# Patient Record
Sex: MALE | Race: WHITE | NOT HISPANIC OR LATINO | Employment: FULL TIME | URBAN - METROPOLITAN AREA
[De-identification: names, ages, dates, MRNs, and addresses within clinical notes are randomized per-mention and may not be internally consistent; named-entity substitution may affect disease eponyms.]

---

## 2020-01-17 ENCOUNTER — OFFICE VISIT (OUTPATIENT)
Dept: FAMILY MEDICINE | Facility: CLINIC | Age: 30
End: 2020-01-17
Payer: COMMERCIAL

## 2020-01-17 VITALS
OXYGEN SATURATION: 97 % | RESPIRATION RATE: 20 BRPM | HEART RATE: 81 BPM | WEIGHT: 212 LBS | BODY MASS INDEX: 30.35 KG/M2 | SYSTOLIC BLOOD PRESSURE: 120 MMHG | HEIGHT: 70 IN | DIASTOLIC BLOOD PRESSURE: 78 MMHG | TEMPERATURE: 98.5 F

## 2020-01-17 DIAGNOSIS — M25.561 RIGHT KNEE PAIN, UNSPECIFIED CHRONICITY: Primary | ICD-10-CM

## 2020-01-17 PROCEDURE — 99203 OFFICE O/P NEW LOW 30 MIN: CPT | Performed by: PHYSICIAN ASSISTANT

## 2020-01-17 RX ORDER — NABUMETONE 500 MG/1
500 TABLET, FILM COATED ORAL 2 TIMES DAILY PRN
Qty: 60 TABLET | Refills: 0 | Status: SHIPPED | OUTPATIENT
Start: 2020-01-17 | End: 2022-11-02

## 2020-01-17 ASSESSMENT — MIFFLIN-ST. JEOR: SCORE: 1927.88

## 2020-01-17 NOTE — PROGRESS NOTES
"Subjective     Nikolay Myers is a 29 year old male who presents to clinic today for the following health issues:    History of Present Illness        He eats 2-3 servings of fruits and vegetables daily.He consumes 0 sweetened beverage(s) daily.He exercises with enough effort to increase his heart rate 10 to 19 minutes per day.  He exercises with enough effort to increase his heart rate 3 or less days per week.   He is taking medications regularly.     Joint Pain    Onset: 5 months    Description:   Location: right knee  Character: Dull ache    Intensity: mild    Progression of Symptoms: same    Accompanying Signs & Symptoms:  Other symptoms: none    History:   Previous similar pain: no       Precipitating factors:   Trauma or overuse: YES    Alleviating factors:  Improved by: nothing    Therapies Tried and outcome: ibuprofen              Reviewed and updated as needed this visit by Provider         Review of Systems   ROS COMP: Constitutional, HEENT, cardiovascular, pulmonary, gi and gu systems are negative, except as otherwise noted.      Objective    /78 (BP Location: Right arm, Patient Position: Chair, Cuff Size: Adult Large)   Pulse 81   Temp 98.5  F (36.9  C) (Oral)   Resp 20   Ht 1.778 m (5' 10\")   Wt 96.2 kg (212 lb)   SpO2 97%   BMI 30.42 kg/m    Body mass index is 30.42 kg/m .  Physical Exam   GENERAL APPEARANCE: healthy, alert and no distress  RESP: lungs clear to auscultation - no rales, rhonchi or wheezes  CV: regular rates and rhythm, normal S1 S2, no S3 or S4 and no murmur, click or rub  ORTHO: Knee Exam: Inspection: small effusion  Tender: patella tendon  Non-tender: lateral joint line, medial joint line  Active Range of Motion: full flexion, full extension  Special tests: normal Valgus stress test, normal Varus, negative Lachman's test               Assessment & Plan     1. Right knee pain, unspecified chronicity  Discussed options. Pt would like imaging given length of pain and feeling " "like knee is buckling.  Await imaging.   - MR Knee Right w/o Contrast; Future  - nabumetone (RELAFEN) 500 MG tablet; Take 1 tablet (500 mg) by mouth 2 times daily as needed for moderate pain  Dispense: 60 tablet; Refill: 0     Tobacco Cessation:   reports that he has never smoked. His smokeless tobacco use includes chew.  Tobacco Cessation Action Plan: Information offered: Patient not interested at this time      BMI:   Estimated body mass index is 30.42 kg/m  as calculated from the following:    Height as of this encounter: 1.778 m (5' 10\").    Weight as of this encounter: 96.2 kg (212 lb).   Weight management plan: Discussed healthy diet and exercise guidelines        Work on weight loss  Regular exercise    No follow-ups on file.    Nydia Ruiz PA-C  [facility]    "

## 2020-01-21 ENCOUNTER — HOSPITAL ENCOUNTER (OUTPATIENT)
Dept: MRI IMAGING | Facility: CLINIC | Age: 30
Discharge: HOME | End: 2020-01-21
Attending: PHYSICIAN ASSISTANT | Admitting: PHYSICIAN ASSISTANT
Payer: COMMERCIAL

## 2020-01-21 DIAGNOSIS — M25.561 RIGHT KNEE PAIN, UNSPECIFIED CHRONICITY: ICD-10-CM

## 2020-01-21 PROCEDURE — 73721 MRI JNT OF LWR EXTRE W/O DYE: CPT | Mod: RT

## 2020-01-22 ENCOUNTER — TELEPHONE (OUTPATIENT)
Dept: FAMILY MEDICINE | Facility: CLINIC | Age: 30
End: 2020-01-22

## 2020-01-22 DIAGNOSIS — S83.281D TEAR OF LATERAL CARTILAGE OR MENISCUS OF KNEE, CURRENT, RIGHT, SUBSEQUENT ENCOUNTER: ICD-10-CM

## 2020-01-22 DIAGNOSIS — S83.282D TEAR OF LATERAL CARTILAGE OR MENISCUS OF KNEE, CURRENT, LEFT, SUBSEQUENT ENCOUNTER: Primary | ICD-10-CM

## 2020-01-22 NOTE — TELEPHONE ENCOUNTER
Pt returned call,  Message given  Voiced understanding  Given phone number to schedule appt    Kortney Rangel RN, BS  Clinical Nurse Triage.

## 2020-01-22 NOTE — TELEPHONE ENCOUNTER
Please call ptCitlaly Link,    Your MRI showed a little tearing in the front of the lateral meniscus due to general wear and tear.  There was also a cartilage defect in the back of the knee as well.  As we discussed, often symptoms are improved by physical therapy, but I think I would like you to have an orthopedics consult first.     I have placed a referral. A representative will call you within 1 business day to help schedule your appointment, or you may contact the [facility] Representative at:    All areas ~ [phone]        1. Mild abnormal signal in the midportion of the anterior horn lateral  meniscus, series 9 image 10, likely representing intrameniscal  degeneration. Difficult to exclude some mild degenerative tearing in  this region.  2. 0.8 cm cartilage defect posterior aspect lateral femoral condyle as  seen on coronal series 8 image 23.  3. Small joint effusion.

## 2020-01-27 NOTE — TELEPHONE ENCOUNTER
RECORDS RECEIVED FROM: Tear of lateral cartilage or meniscus of knee, current, right/MRI/Nydia Ruiz PA-C/bcbs/ortho con   DATE RECEIVED: Feb 3, 2020   NOTES STATUS DETAILS   OFFICE NOTE from referring provider Internal  Nydia Ruiz PA-C    OFFICE NOTE from other specialist N/A    DISCHARGE SUMMARY from hospital N/A    DISCHARGE REPORT from the ER N/A    OPERATIVE REPORT N/A    MEDICATION LIST Internal    IMPLANT RECORD/STICKER N/A    LABS     CBC/DIFF N/A    CULTURES N/A    INJECTIONS DONE IN RADIOLOGY N/A    MRI Internal    CT SCAN N/A    XRAYS (IMAGES & REPORTS) Internal    TUMOR     PATHOLOGY  Slides & report N/A      01/27/20   2:59 PM   Pre-visit complete  Nikki Glamm, CMA

## 2020-02-03 ENCOUNTER — OFFICE VISIT (OUTPATIENT)
Dept: ORTHOPEDICS | Facility: CLINIC | Age: 30
End: 2020-02-03
Payer: COMMERCIAL

## 2020-02-03 ENCOUNTER — PRE VISIT (OUTPATIENT)
Dept: ORTHOPEDICS | Facility: CLINIC | Age: 30
End: 2020-02-03

## 2020-02-03 VITALS — WEIGHT: 205 LBS | HEIGHT: 70 IN | BODY MASS INDEX: 29.35 KG/M2

## 2020-02-03 DIAGNOSIS — S83.281D TEAR OF LATERAL CARTILAGE OR MENISCUS OF KNEE, CURRENT, RIGHT, SUBSEQUENT ENCOUNTER: ICD-10-CM

## 2020-02-03 ASSESSMENT — MIFFLIN-ST. JEOR: SCORE: 1896.12

## 2020-02-03 NOTE — PROGRESS NOTES
CHIEF CONCERN: Right lateral knee pain    HISTORY:   Very pleasant 29-year-old male injured his right knee last summer while playing in a [employer] kickball tournament he fell kind of awkwardly onto his right knee hyper valgus bent knee.  Pain over the lateral aspect of his knee.  He admits that his knee is sore however he is able to do everything that he wants to do.  It does not limit him too much.  He is able to do the things that he is wants to do.  I asked him today if you is bothered enough that he would consider surgery he admits that he would if you could fix it for the future but he is really not that bothered at this time from it.    PAST MEDICAL HISTORY: (Reviewed with the patient and in the [facility] medical record)  1. None    PAST SURGICAL HISTORY: (Reviewed with the patient and in the [facility] medical record)  1. None    MEDICATIONS: (Reviewed with the patient and in the [facility] medical record)    Notable medications include: None    ALLERGIES: (Reviewed with the patient and in the [facility] medical record)  1. None      SOCIAL HISTORY: (Reviewed with the patient and in the medical record)  --Tobacco: Non-smoker  --Occupation: He works as a   --Avocation/Sport: He enjoys golf bowling spending time with friends    FAMILY HISTORY: (Reviewed with the patient and in the medical record)  -- No family history of bleeding, clotting, or difficulty with anesthesia    REVIEW OF SYSTEMS: (Reviewed with the patient and on the health intake form)  -- A comprehensive 10 point review of systems was conducted and is negative except as noted in the HPI    EXAM:     General: Awake, Alert and Oriented, No acute Distress. Articulate and Interactive    Body mass index is 29.41 kg/m .    Right lower extremity :    Skin is Warm and Well perfused, no suggestion of infection    Stable to varus and valgus stress testing.  Stable anterior and posterior drawer testing.  No pivot shift.    Range of motion 0 to 135 degrees    Lachman 0, no  pivot shift    No crepitation    Trace lateral joint line tenderness    EHL/FHL/TA/GS 5/5    Sensation intact L3-S1    2+ Dorsalis Pedis Pulse    IMAGING:    Plain Radiographs: No fractures dislocations.  Well-preserved joint space    MRI: MRI does demonstrate an isolated focal chondral defect of the lateral femoral condyle less than 10 mm in diameter.  Some change is noted in the anterior horn lateral meniscus that I do not think this represents a discrete tear    ASSESSMENT:  1. Focal chondral defect lateral femoral condyle    PLAN:  1. I long discussion with the patient regarding his right knee.  I reviewed with him his diagnosis and I showed him the images.  2. I did tell him that in general I do not believe in prophylactic cartilage surgery.  The best reason to do surgery is if he is symptomatic and he is unable to do the things that he wants to do  3. He can follow-up with me on an as-needed basis if he would like to discuss surgery in the future  4. Realistic surgical plan will be knee arthroscopy, chondroplasty, possible osteochondral autograft transplantation

## 2020-02-03 NOTE — LETTER
2/3/2020       RE: Nikolay Myers  [address] [facility] [address]     Dear Colleague,    Thank you for referring your patient, Nikolay Myers, to the [facility] at [facility]. Please see a copy of my visit note below.    CHIEF CONCERN: Right lateral knee pain    HISTORY:   Very pleasant 29-year-old male injured his right knee last summer while playing in a [employer] kickball tournament he fell kind of awkwardly onto his right knee hyper valgus bent knee.  Pain over the lateral aspect of his knee.  He admits that his knee is sore however he is able to do everything that he wants to do.  It does not limit him too much.  He is able to do the things that he is wants to do.  I asked him today if you is bothered enough that he would consider surgery he admits that he would if you could fix it for the future but he is really not that bothered at this time from it.    PAST MEDICAL HISTORY: (Reviewed with the patient and in the [facility] medical record)  1. None    PAST SURGICAL HISTORY: (Reviewed with the patient and in the [facility] medical record)  1. None    MEDICATIONS: (Reviewed with the patient and in the [facility] medical record)    Notable medications include: None    ALLERGIES: (Reviewed with the patient and in the [facility] medical record)  1. None      SOCIAL HISTORY: (Reviewed with the patient and in the medical record)  --Tobacco: Non-smoker  --Occupation: He works as a   --Avocation/Sport: He enjoys golf bowling spending time with friends    FAMILY HISTORY: (Reviewed with the patient and in the medical record)  -- No family history of bleeding, clotting, or difficulty with anesthesia    REVIEW OF SYSTEMS: (Reviewed with the patient and on the health intake form)  -- A comprehensive 10 point review of systems was conducted and is negative except as noted in the HPI    EXAM:     General: Awake, Alert and Oriented, No acute Distress. Articulate and Interactive    Body  mass index is 29.41 kg/m .    Right lower extremity :    Skin is Warm and Well perfused, no suggestion of infection    Stable to varus and valgus stress testing.  Stable anterior and posterior drawer testing.  No pivot shift.    Range of motion 0 to 135 degrees    Lachman 0, no pivot shift    No crepitation    Trace lateral joint line tenderness    EHL/FHL/TA/GS 5/5    Sensation intact L3-S1    2+ Dorsalis Pedis Pulse    IMAGING:    Plain Radiographs: No fractures dislocations.  Well-preserved joint space    MRI: MRI does demonstrate an isolated focal chondral defect of the lateral femoral condyle less than 10 mm in diameter.  Some change is noted in the anterior horn lateral meniscus that I do not think this represents a discrete tear    ASSESSMENT:  1. Focal chondral defect lateral femoral condyle    PLAN:  1. I long discussion with the patient regarding his right knee.  I reviewed with him his diagnosis and I showed him the images.  2. I did tell him that in general I do not believe in prophylactic cartilage surgery.  The best reason to do surgery is if he is symptomatic and he is unable to do the things that he wants to do  3. He can follow-up with me on an as-needed basis if he would like to discuss surgery in the future  4. Realistic surgical plan will be knee arthroscopy, chondroplasty, possible osteochondral autograft transplantation        Again, thank you for allowing me to participate in the care of your patient.      Sincerely,    Maykel Santiago MD

## 2020-03-01 ENCOUNTER — HEALTH MAINTENANCE LETTER (OUTPATIENT)
Age: 30
End: 2020-03-01

## 2020-10-05 ENCOUNTER — VIRTUAL VISIT (OUTPATIENT)
Dept: FAMILY MEDICINE | Facility: CLINIC | Age: 30
End: 2020-10-05
Payer: COMMERCIAL

## 2020-10-05 DIAGNOSIS — B00.9 HERPES SIMPLEX VIRUS INFECTION: Primary | ICD-10-CM

## 2020-10-05 PROCEDURE — 99213 OFFICE O/P EST LOW 20 MIN: CPT | Mod: 95 | Performed by: FAMILY MEDICINE

## 2020-10-05 RX ORDER — ACYCLOVIR 400 MG/1
400 TABLET ORAL EVERY 8 HOURS
Qty: 15 TABLET | Refills: 3 | Status: SHIPPED | OUTPATIENT
Start: 2020-10-05 | End: 2022-11-02

## 2020-10-05 NOTE — PROGRESS NOTES
"Nikolay Myers is a 30 year old male who is being evaluated via a billable video visit.      The patient has been notified of following:     \"This video visit will be conducted via a call between you and your physician/provider. We have found that certain health care needs can be provided without the need for an in-person physical exam.  This service lets us provide the care you need with a video conversation.  If a prescription is necessary we can send it directly to your pharmacy.  If lab work is needed we can place an order for that and you can then stop by our lab to have the test done at a later time.    Video visits are billed at different rates depending on your insurance coverage.  Please reach out to your insurance provider with any questions.    If during the course of the call the physician/provider feels a video visit is not appropriate, you will not be charged for this service.\"    Patient has given verbal consent for Video visit? Yes  How would you like to obtain your AVS? MyChart  If you are dropped from the video visit, the video invite should be resent to: Text to cell phone: [phone]  Will anyone else be joining your video visit? No    Subjective     Nikolay Myers is a 30 year old male who presents today via video visit for the following health issues:    HPI     Rash  Onset/Duration: 1 week  Description  Location: upper lip  Character: red  Itching: no  Intensity:  mild  Progression of Symptoms:  improving  Accompanying signs and symptoms:   Fever: no  Body aches or joint pain: no  Sore throat symptoms: no  Recent cold symptoms: no  History:           Previous episodes of similar rash: YES  New exposures:  None  Recent travel: no  Exposure to similar rash: no  Precipitating or alleviating factors: none  Therapies tried and outcome: none    Cold sore on his upper lip, he's had years of on and off issues,      Video Start Time: 9:30        Review of Systems   Constitutional, HEENT, " "cardiovascular, pulmonary, gi and gu systems are negative, except as otherwise noted.      Objective           Vitals:  No vitals were obtained today due to virtual visit.    Physical Exam     GENERAL: Healthy, alert and no distress  EYES: Eyes grossly normal to inspection.  No discharge or erythema, or obvious scleral/conjunctival abnormalities.  RESP: No audible wheeze, cough, or visible cyanosis.  No visible retractions or increased work of breathing.    SKIN: Visible skin clear. No significant rash, abnormal pigmentation or lesions.  NEURO: Cranial nerves grossly intact.  Mentation and speech appropriate for age.  PSYCH: Mentation appears normal, affect normal/bright, judgement and insight intact, normal speech and appearance well-groomed.      Completed 9:36          Assessment & Plan     Herpes simplex virus infection    - acyclovir (ZOVIRAX) 400 MG tablet; Take 1 tablet (400 mg) by mouth every 8 hours for 5 days     BMI:   Estimated body mass index is 29.41 kg/m  as calculated from the following:    Height as of 2/3/20: 1.778 m (5' 10\").    Weight as of 2/3/20: 93 kg (205 lb).                No follow-ups on file.    Maykel Sims MD  [facility]      Video-Visit Details    Type of service:  Video Visit    Video End Time:9:36    Originating Location (pt. Location): Home    Distant Location (provider location):  [facility] [place]     Platform used for Video Visit: Dejan        "

## 2020-12-14 ENCOUNTER — HEALTH MAINTENANCE LETTER (OUTPATIENT)
Age: 30
End: 2020-12-14

## 2021-04-17 ENCOUNTER — HEALTH MAINTENANCE LETTER (OUTPATIENT)
Age: 31
End: 2021-04-17

## 2021-10-02 ENCOUNTER — HEALTH MAINTENANCE LETTER (OUTPATIENT)
Age: 31
End: 2021-10-02

## 2022-05-08 ENCOUNTER — HEALTH MAINTENANCE LETTER (OUTPATIENT)
Age: 32
End: 2022-05-08

## 2022-11-02 ENCOUNTER — VIRTUAL VISIT (OUTPATIENT)
Dept: FAMILY MEDICINE | Facility: CLINIC | Age: 32
End: 2022-11-02
Payer: COMMERCIAL

## 2022-11-02 DIAGNOSIS — K21.9 GASTROESOPHAGEAL REFLUX DISEASE, UNSPECIFIED WHETHER ESOPHAGITIS PRESENT: ICD-10-CM

## 2022-11-02 DIAGNOSIS — R06.83 SNORING: Primary | ICD-10-CM

## 2022-11-02 PROCEDURE — 99213 OFFICE O/P EST LOW 20 MIN: CPT | Mod: 95 | Performed by: PHYSICIAN ASSISTANT

## 2022-11-02 RX ORDER — PANTOPRAZOLE SODIUM 40 MG/1
40 TABLET, DELAYED RELEASE ORAL DAILY
Qty: 30 TABLET | Refills: 1 | Status: SHIPPED | OUTPATIENT
Start: 2022-11-02 | End: 2023-01-06

## 2022-11-02 NOTE — PATIENT INSTRUCTIONS
Nikolay,    I sent in protonix for your heartburn. Take this 30 minutes before breakfast.   Try to cut down/out caffeine, alcohol, red sauces and large meals.  Get a wedge for your bed to have your head a little elevated.  Follow up with me if your symptoms do not improve.

## 2022-11-02 NOTE — PROGRESS NOTES
Nikolay is a 32 year old who is being evaluated via a billable video visit.      How would you like to obtain your AVS? MyChart  If the video visit is dropped, the invitation should be resent by: Text to cell phone: [phone]  Will anyone else be joining your video visit? No              Subjective   Nikolay is a 32 year old presenting for the following health issues:  Referral (Sleep apnea testing) and Gastrophageal Reflux      HPI     Pt interested in a sleep study  Pt snores per his wife x about a year  Pt admits he has some sleepiness by 5pm  He wakes 2-3 times per night but able to fall back to sleep  He was told he stops breathing     Pt has gerd sxs that come and go  Worse if he drinks red wine  Sometimes has to get up and vomit acid when he has burning in esophagus  Takes zantac and tums prn which sometimes helps, and sometimes doesn't  He has tried prilosec which didn't seem to help x 14 days  Coffee: 2-3 cups per day  Etoh: one beer or cocktail per day  Not many spicy foods, but likes red sauce  Denies food sticking in esophagus  Denies pain on swallowing  Denies unint weight loss  Eats dinner around 7pm and goes to bed around 11  sxs worse when supine  Denies nsaid use.      Review of Systems         Objective           Vitals:  No vitals were obtained today due to virtual visit.    Physical Exam   GENERAL: Healthy, alert and no distress  EYES: Eyes grossly normal to inspection.  No discharge or erythema, or obvious scleral/conjunctival abnormalities.  RESP: No audible wheeze, cough, or visible cyanosis.  No visible retractions or increased work of breathing.    SKIN: Visible skin clear. No significant rash, abnormal pigmentation or lesions.  NEURO: Cranial nerves grossly intact.  Mentation and speech appropriate for age.  PSYCH: Mentation appears normal, affect normal/bright, judgement and insight intact, normal speech and appearance well-groomed.        Assessment and Plan:     (R06.83) Snoring  (primary  encounter diagnosis)  Comment: recd sleep study  Plan: Adult Sleep Eval & Management          Referral            (K21.9) Gastroesophageal reflux disease, unspecified whether esophagitis present  Comment:   Plan: start pantoprazole (PROTONIX) 40 MG EC tablet PO every day 30min before breakfast          Patient Instructions   Nikolay,    I sent in protonix for your heartburn. Take this 30 minutes before breakfast.   Try to cut down/out caffeine, alcohol, red sauces and large meals.  Get a wedge for your bed to have your head a little elevated.  Follow up with me if your symptoms do not improve.              Ruba Mccormick PA-C            Video-Visit Details    Video Start Time: 4:35 PM    Type of service:  Video Visit    Video End Time:4:59 PM    Originating Location (pt. Location): Home        Distant Location (provider location):  On-site    Platform used for Video Visit: Dejan

## 2022-12-14 ENCOUNTER — VIRTUAL VISIT (OUTPATIENT)
Dept: SLEEP MEDICINE | Facility: CLINIC | Age: 32
End: 2022-12-14
Attending: PHYSICIAN ASSISTANT
Payer: COMMERCIAL

## 2022-12-14 VITALS — BODY MASS INDEX: 30.06 KG/M2 | HEIGHT: 70 IN | WEIGHT: 210 LBS

## 2022-12-14 DIAGNOSIS — R06.83 SNORING: ICD-10-CM

## 2022-12-14 DIAGNOSIS — F41.9 ANXIETY AND DEPRESSION: ICD-10-CM

## 2022-12-14 DIAGNOSIS — G47.8 SLEEP PARALYSIS: ICD-10-CM

## 2022-12-14 DIAGNOSIS — G47.21 CIRCADIAN RHYTHM SLEEP DISORDER, DELAYED SLEEP PHASE TYPE: ICD-10-CM

## 2022-12-14 DIAGNOSIS — R53.82 CHRONIC FATIGUE: ICD-10-CM

## 2022-12-14 DIAGNOSIS — R06.81 WITNESSED EPISODE OF APNEA: Primary | ICD-10-CM

## 2022-12-14 DIAGNOSIS — E66.811 OBESITY (BMI 30.0-34.9): ICD-10-CM

## 2022-12-14 DIAGNOSIS — F51.04 CHRONIC INSOMNIA: ICD-10-CM

## 2022-12-14 DIAGNOSIS — F32.A ANXIETY AND DEPRESSION: ICD-10-CM

## 2022-12-14 PROCEDURE — 99204 OFFICE O/P NEW MOD 45 MIN: CPT | Mod: 95 | Performed by: INTERNAL MEDICINE

## 2022-12-14 ASSESSMENT — SLEEP AND FATIGUE QUESTIONNAIRES
HOW LIKELY ARE YOU TO NOD OFF OR FALL ASLEEP WHILE SITTING AND TALKING TO SOMEONE: WOULD NEVER DOZE
HOW LIKELY ARE YOU TO NOD OFF OR FALL ASLEEP WHILE SITTING QUIETLY AFTER LUNCH WITHOUT ALCOHOL: WOULD NEVER DOZE
HOW LIKELY ARE YOU TO NOD OFF OR FALL ASLEEP WHEN YOU ARE A PASSENGER IN A CAR FOR AN HOUR WITHOUT A BREAK: SLIGHT CHANCE OF DOZING
HOW LIKELY ARE YOU TO NOD OFF OR FALL ASLEEP WHILE SITTING AND READING: WOULD NEVER DOZE
HOW LIKELY ARE YOU TO NOD OFF OR FALL ASLEEP WHILE LYING DOWN TO REST IN THE AFTERNOON WHEN CIRCUMSTANCES PERMIT: MODERATE CHANCE OF DOZING
HOW LIKELY ARE YOU TO NOD OFF OR FALL ASLEEP WHILE SITTING INACTIVE IN A PUBLIC PLACE: WOULD NEVER DOZE
HOW LIKELY ARE YOU TO NOD OFF OR FALL ASLEEP IN A CAR, WHILE STOPPED FOR A FEW MINUTES IN TRAFFIC: WOULD NEVER DOZE
HOW LIKELY ARE YOU TO NOD OFF OR FALL ASLEEP WHILE WATCHING TV: MODERATE CHANCE OF DOZING

## 2022-12-14 NOTE — NURSING NOTE
Chief Complaint   Patient presents with     Video Visit     Snoring       Patient confirms medications and allergies are accurate via patients echeck in completion, and or denies any changes since last reviewed/verified.     Ami Blanco, Virtual Facilitator/LPN

## 2022-12-14 NOTE — PROGRESS NOTES
Nikolay is a 32 year old who is being evaluated via a billable video visit.      How would you like to obtain your AVS? MyChart  If the video visit is dropped, the invitation should be resent by: Text to cell phone: [phone]  Will anyone else be joining your video visit? No    Ami Blanco, Amena Facilitator/LPN      Video-Visit Details    Video Start Time: 11:05 AM  Type of service:  Video Visit    Video End Time: 11:28 AM    Originating Location (pt. Location): Home        Distant Location (provider location):  Off-site    Platform used for Video Visit: [facility]   Outpatient Sleep Medicine Consultation:      Name: Nikolay Myers MRN# [id number]   Age: 32 year old YOB: 1990     Date of Consultation: December 14, 2022  Consultation is requested by: Ruba Mccormick PA-C  [address]  [place] [address] Ruba Mccormick  Primary care provider: No Ref-Primary, Physician       Reason for Sleep Consult:     Nikolay Myers is sent by Ruba Mccormick for a sleep consultation regarding obtaining evaluation for possible sleep apnea.    Patient s Reason for visit  Nikolay Myers main reason for visit: Stopping breathing while sleeping  Patient states problem(s) started: 12 months ago  Nikolay Myers's goals for this visit: Find a solution via test/treatment           Assessment and Plan:   Snoring, observed apneas,  non restorative sleep, fatigue, EDS. Possible Obstructive sleep apnea  STOP BANG score is 5/8. Patient likely has sleep apnea based on clinical history of snoring, observed apneas during sleep, tiredness, neck size >16 inches and male gender.    HST/ Polysomnography reviewed.  Recommend HST to evaluate for possible sleep apnea and if the HST is negative we will consider obtaining in-lab sleep study and he was agreeable with the plan..   Obstructive sleep apnea and consequences of untreated sleep apnea were reviewed.  Information provided regarding treatment options for  MARITZA.  Patient reports sleep paralysis and occasional nightmares, but denies cataplexy.  These could be due to untreated sleep apnea.  Sleep paralysis can also be associated with anxiety and depression.  I do not suspect pathological hypersomnolence at this time.     Insomnia -multifactorial. 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  Sleep hygiene: Encouraged to follow good sleep hygiene/behavioral techniques, including avoiding activities such as reading or using electronic devices in bed, and avoiding alcohol within 2 to 3 hours before bed and  avoiding daytime naps.  Anxiety/depression: He denies suicidal ideations or thoughts of harming others . We discussed the association of insomnia with anxiety and depression. Recommended the patient to  follow-up with his primary care provider/psychiatrist/therapist for evaluation and management of both anxiety and depression.    Delayed sleep phase:  Recommended to follow regular sleep schedule,optimizing the duration and circadian timing of sleep and aim at obtaining at least 7 to 8 hours per night and avoid sleep deprivation.  We arrived at a goal bedtime of 10:30 PM and a goal wake up time of 6:30 AM.  Patient was instructed to minimize exposure to bright light a couple hours before the goal bedtime and avoiding mind stimulating activities before bed.   Recommended exposure to bright light for 30 to 60 minutes soon after awakening using bright light box of 10,000 Lux intensity to help with sleep phase advancement.     Obesity: We discussed weight management with healthy diet, and exercise.      Patient was strongly advised to avoid driving, operating any heavy machinery or other hazardous situations while drowsy or sleepy.  Patient was counseled on the importance of driving while alert, to pull over if drowsy, or nap before getting  "into the vehicle if sleepy.        Plan is to follow-up via video visit in approximately 2 weeks after home sleep study is completed to review the test results and to discuss plan of care.    Summary Counseling:    Sleep Testing Reviewed  Obstructive Sleep Apnea Reviewed  Complications of Untreated Sleep Apnea Reviewed    Medical Decision-making:   Educational materials provided in instruction.    CC: Ruba Mccormick    The above note was dictated using voice recognition software. Although reviewed after completion, some word and grammatical error may remain . Please contact the author for any clarifications.    \"I spent a total of 50  Minutes with Nikolay Myers during today's video visit.  Most of this time was spent counseling the patient and  coordinating care regarding  MARITZA, HST/PSG, consequences of untreated sleep apnea, insomnia, stimulus control measures, sleep hygiene, regularizing sleep schedule, anxiety, depression, sleep paralysis , weight management , and chart review., including documentation and further activities as noted above.\"       MD PIA Hart[place] [address] [address] [phone]  Dept: [phone]               History of Present Illness:     Past Sleep Evaluations:none    SLEEP-WAKE SCHEDULE:     Work/School Days: Patient goes to school/work: Yes (7am-5pm M-Fri)   Usually gets into bed at 11:00  Takes patient about 20-30 mins to fall asleep  Has trouble falling asleep 1-2 nights per week. Active mind, anxiety affect sleep.  Wakes up in the middle of the night 3-4 times.  Wakes up due to Snorting self awake;Anxiety  He has trouble falling back asleep 2 times a week.   It usually takes 30+ minutes to get back to sleep. Active mind, anxiety affect sleep.  Just lays down trying to fall asleep, look at phone , check emails  Patient is usually up at 6:30 am  Uses alarm: Yes    Weekends/Non-work Days/All " Other Days:  Usually gets into bed at 11:30   Takes patient about 20-30 minutes to fall asleep  Patient is usually up at 8-9am  Uses alarm: No    He is a night person. He reports nonrestorative sleep, fatigue particularly during the afternoon hours and when he gets home between 530-6pm he  feels drained.  He takes a nap sometimes and does not feel better after waking up from the nap.    Sleep Need  Patient gets  6-7 hours sleep on average   Patient thinks he needs about 8-9 sleep    Nikolay Myers prefers to sleep in this position(s): Back;Side   Patient states they do the following activities in bed: Read;Watch TV;Use phone, computer, or tablet    Naps  Patient takes a purposeful nap 3-4 times a week and naps are usually 1-2 hours in duration  He feels better after a nap: No  He dozes off unintentionally 0 days per week  Patient has had a driving accident or near-miss due to sleepiness/drowsiness: No      SLEEP DISRUPTIONS:    Breathing/Snoring  Patient snores:Yes  Other people complain about his snoring: Yes  Patient has been told he stops breathing in his sleep:Yes   Snort arousals: Yes  Gasping/choking: No  He has issues with the following: Morning mouth dryness;Stuffy nose when you wake up;Heartburn or reflux at night  He denies morning headaches    Movement:  Patient gets pain, discomfort, with an urge to move:  No  It happens when he is resting:  No  It happens more at night:  No  Patient has been told he kicks his legs at night:  No     Behaviors in Sleep:  Nikolay Myers has experienced the following behaviors while sleeping: Recurring Nightmares;Waking up paralyzed;See or hear things that are not really there upon awakening or just falling asleep   Nightmares: He denies h/o PTSD.  Nightmares-some form of being chased or that he didn't graduate from college and is losing his  job.  He denies sleepwalking, sleep eating, sleep talking or dream enactment behaviors.  He has experienced sudden muscle weakness  during the day: No      CAFFEINE AND OTHER SUBSTANCES:    Patient consumes caffeinated beverages per day:  2  Last caffeine use is usually: Noon  List of any prescribed or over the counter stimulants that patient takes: N/A  List of any prescribed or over the counter sleep medication patient takes: Na  List of previous sleep medications that patient has tried: Melatonin in the past but havent taken it in a long time.  Patient drinks alcohol to help them sleep: No  Patient drinks alcohol near bedtime: Yes    Family History:  Patient has a family member been diagnosed with a sleep disorder: No        SCALES:    EPWORTH SLEEPINESS SCALE      Westby Sleepiness Scale ( LAMIN Nelson  7617-4521<br>ESS - [place]/English - Final version - 21 Nov 07 - [facility] [employer].) 12/14/2022   Sitting and reading Would never doze   Watching TV Moderate chance of dozing   Sitting, inactive in a public place (e.g. a theatre or a meeting) Would never doze   As a passenger in a car for an hour without a break Slight chance of dozing   Lying down to rest in the afternoon when circumstances permit Moderate chance of dozing   Sitting and talking to someone Would never doze   Sitting quietly after a lunch without alcohol Would never doze   In a car, while stopped for a few minutes in traffic Would never doze   Westby Score (MC) 5   Westby Score (Sleep) 5         INSOMNIA SEVERITY INDEX (SARAHI)      Insomnia Severity Index (SARAHI) 12/14/2022   Difficulty falling asleep 1   Difficulty staying asleep 3   Problems waking up too early 2   How SATISFIED/DISSATISFIED are you with your CURRENT sleep pattern? 3   How NOTICEABLE to others do you think your sleep problem is in terms of impairing the quality of your life? 3   How WORRIED/DISTRESSED are you about your current sleep problem? 2   To what extent do you consider your sleep problem to INTERFERE with your daily functioning (e.g. daytime fatigue, mood, ability to function at work/daily chores,  "concentration, memory, mood, etc.) CURRENTLY? 3   SARAHI Total Score 17       Guidelines for Scoring/Interpretation:  Total score categories:  0-7 = No clinically significant insomnia   8-14 = Subthreshold insomnia   15-21 = Clinical insomnia (moderate severity)  22-28 = Clinical insomnia (severe)  Used via courtesy of www.Partenderealth.va.gov with permission from Hany Pablo PhD., [facility]      STOP BANG     STOP BANG Questionnaire (  2008, the American Society of Anesthesiologists, Inc. [employer]ireya Rafael [employer] 12/14/2022   1. Snoring - Do you snore loudly (louder than talking or loud enough to be heard through closed doors)? Yes   2. Tired - Do you often feel tired, fatigued, or sleepy during daytime? Yes   3. Observed - Has anyone observed you stop breathing during your sleep? Yes   4. Blood pressure - Do you have or are you being treated for high blood pressure? No   5. BMI - BMI more than 35 kg/m2? No   6. Age - Age over 50 yr old? No   7. Neck circumference - Neck circumference greater than 40 cm? Yes  17\"   8. Gender - Gender male? Yes   STOP BANG Score (MC):  5   B/P Clinic: -   BMI Clinic: 30.13         GAD7    No flowsheet data found.      CAGE-AID    No flowsheet data found.    CAGE-AID reprinted with permission from the [place] Medical Journal, KORI Fernando and MERCEDES Giron, \"Conjoint screening questionnaires for alcohol and drug abuse\" [place] Medical Journal 94: 135-140, 1995.      PATIENT HEALTH QUESTIONNAIRE-9 (PHQ - 9)    No flowsheet data found.    Developed by Shaquille Bravo, Amrita Hall, Candido Locke and colleagues, with an educational shaw from [employer] No permission required to reproduce, translate, display or distribute.        Allergies:    No Known Allergies    Medications:    Current Outpatient Medications   Medication Sig Dispense Refill     pantoprazole (PROTONIX) 40 MG EC tablet Take 1 tablet (40 mg) by mouth daily 30 tablet 1       Problem " List:  There are no problems to display for this patient.       Past Medical/Surgical History:  No past medical history on file.  No past surgical history on file.    Social History:  Social History     Socioeconomic History     Marital status:      Spouse name: Not on file     Number of children: Not on file     Years of education: Not on file     Highest education level: Not on file   Occupational History     Not on file   Tobacco Use     Smoking status: Never     Smokeless tobacco: Current     Types: Chew   Substance and Sexual Activity     Alcohol use: Yes     Comment: socially     Drug use: Never     Sexual activity: Yes     Partners: Female   Other Topics Concern     Not on file   Social History Narrative     Not on file     Social Determinants of Health     Financial Resource Strain: Not on file   Food Insecurity: Not on file   Transportation Needs: Not on file   Physical Activity: Not on file   Stress: Not on file   Social Connections: Not on file   Intimate Partner Violence: Not on file   Housing Stability: Not on file       Family History:  Family History   Problem Relation Age of Onset     Family History Negative Mother      Family History Negative Father      Stomach Cancer Maternal Grandmother        Review of Systems:  A complete review of systems reviewed by me is negative with the exeption of what has been mentioned in the history of present illness.  In the last TWO WEEKS have you experienced any of the following symptoms?  Fevers: No  Night Sweats: No  Weight Gain: Yes  Pain at Night: No  Double Vision: No  Changes in Vision: No  Difficulty Breathing through Nose: No  Sore Throat in Morning: Yes  Dry Mouth in the Morning: Yes  Shortness of Breath Lying Flat: No  Shortness of Breath With Activity: No  Awakening with Shortness of Breath: No  Increased Cough: No  Heart Racing at Night: No  Swelling in Feet or Legs: No  Diarrhea at Night: No  Heartburn at Night: Yes  Urinating More than Once at  "Night: No  Losing Control of Urine at Night: No  Joint Pains at Night: No  Headaches in Morning: No  Weakness in Arms or Legs: No  Depressed Mood: Yes, denies suicidal ideations or thoughts of harming others   Anxiety: Yes     Physical Examination:  Vitals: Ht 1.778 m (5' 10\")   Wt 95.3 kg (210 lb)   BMI 30.13 kg/m    BMI= Body mass index is 30.13 kg/m .  General: No apparent distress, appropriately groomed  Head: Normocephalic, atraumatic   Neck:Circumference: 17 inches patient reported  Chest: No cough, no audible wheezing, able to talk in full sentences  Skin: no rash  Psych: coherent speech, normal rate and volume, able to articulate logical thoughts, able   to abstract reason, no tangential thoughts, no hallucinations   or delusions  His  affect is normal  Neuro:  Mental status: Alert and  Oriented X 3  Speech: normal             Data: All pertinent previous laboratory data reviewed     No results for input(s): NA, POTASSIUM, CHLORIDE, CO2, ANIONGAP, GLC, BUN, CR, GIOVANY in the last 58340 hours.    No results for input(s): WBC, RBC, HGB, HCT, MCV, MCH, MCHC, RDW, PLT in the last 51946 hours.    No results for input(s): PROTTOTAL, ALBUMIN, BILITOTAL, ALKPHOS, AST, ALT, BILIDIRECT in the last 78702 hours.    No results found for: TSH    No results found for: UAMP, UBARB, BENZODIAZEUR, UCANN, UCOC, OPIT, UPCP    No results found for: IRONSAT, CV40575, RHODA    No results found for: PH, PHARTERIAL, PO2, [id number], SAT, PCO2, HCO3, BASEEXCESS, ASHKAN, BEB         Echocardiology: No results found for this or any previous visit (from the past 4320 hour(s)).    Chest x-ray: No results found for this or any previous visit from the past 365 days.      Chest CT: No results found for this or any previous visit from the past 365 days.      PFT: Most Recent [facility] Pulmonary Function Testing    No results found for: [address]          Nicole Todd MD 12/14/2022    "

## 2022-12-14 NOTE — PATIENT INSTRUCTIONS
"      MY TREATMENT INFORMATION FOR SLEEP APNEA-  Nikolay Myers    DOCTOR : Nicole Todd MD    Am I having a home sleep study?  --->Watch the video for the device you are using:    -/drop off device-   [email or web address]/watch?v=yGGFBdELGhk     Frequently asked questions:  1. What is Obstructive Sleep Apnea (MARITZA)? MARITZA is the most common type of sleep apnea. Apnea means, \"without breath.\"  Apnea is most often caused by narrowing or collapse of the upper airway as muscles relax during sleep.   Almost everyone has occasional apneas. Most people with sleep apnea have had brief interruptions at night frequently for many years.  The severity of sleep apnea is related to how frequent and severe the events are.   2. What are the consequences of MARITZA? Symptoms include: feeling sleepy during the day, snoring loudly, gasping or stopping of breathing, trouble sleeping, and occasionally morning headaches or heartburn at night.  Sleepiness can be serious and even increase the risk of falling asleep while driving. Other health consequences may include development of high blood pressure and other cardiovascular disease in persons who are susceptible. Untreated MARITZA  can contribute to heart disease, stroke and diabetes.   3. What are the treatment options? In most situations, sleep apnea is a lifelong disease that must be managed with daily therapy. Medications are not effective for sleep apnea and surgery is generally not considered until other therapies have been tried. Your treatment is your choice . Continuous Positive Airway (CPAP) works right away and is the therapy that is effective in nearly everyone. An oral device to hold your jaw forward is usually the next most reliable option. Other options include postioning devices (to keep you off your back), weight loss, and surgery including a tongue pacing device. There is more detail about some of these options below.  4. Are my sleep studies " covered by insurance? Although we will request verification of coverage, we advise you also check in advance of the study to ensure there is coverage.    Important tips for those choosing CPAP and similar devices   Know your equipment:  CPAP is continuous positive airway pressure that prevents obstructive sleep apnea by keeping the throat from collapsing while you are sleeping. In most cases, the device is  smart  and can slowly self-adjusts if your throat collapses and keeps a record every day of how well you are treated-this information is available to you and your care team.  BPAP is bilevel positive airway pressure that keeps your throat open and also assists each breath with a pressure boost to maintain adequate breathing.  Special kinds of BPAP are used in patients who have inadequate breathing from lung or heart disease. In most cases, the device is  smart  and can slowly self-adjusts to assist breathing. Like CPAP, the device keeps a record of how well you are treated.  Your mask is your connection to the device. You get to choose what feels most comfortable and the staff will help to make sure if fits. Here: are some examples of the different masks that are available:       Key points to remember on your journey with sleep apnea:  Sleep study.  PAP devices often need to be adjusted during a sleep study to show that they are effective and adjusted right.  Good tips to remember: Try wearing just the mask during a quiet time during the day so your body adapts to wearing it. A humidifier is recommended for comfort in most cases to prevent drying of your nose and throat. Allergy medication from your provider may help you if you are having nasal congestion.  Getting settled-in. It takes more than one night for most of us to get used to wearing a mask. Try wearing just the mask during a quiet time during the day so your body adapts to wearing it. A humidifier is recommended for comfort in most cases. Our team will  work with you carefully on the first day and will be in contact within 4 days and again at 2 and 4 weeks for advice and remote device adjustments. Your therapy is evaluated by the device each day.   Use it every night. The more you are able to sleep naturally for 7-8 hours, the more likely you will have good sleep and to prevent health risks or symptoms from sleep apnea. Even if you use it 4 hours it helps. Occasionally all of us are unable to use a medical therapy, in sleep apnea, it is not dangerous to miss one night.   Communicate. Call our skilled team on the number provided on the first day if your visit for problems that make it difficult to wear the device. Over 2 out of 3 patients can learn to wear the device long-term with help from our team. Remember to call our team or your sleep providers if you are unable to wear the device as we may have other solutions for those who cannot adapt to mask CPAP therapy. It is recommended that you sleep your sleep provider within the first 3 months and yearly after that if you are not having problems.   Use it for your health. We encourage use of CPAP masks during daytime quiet periods to allow your face and brain to adapt to the sensation of CPAP so that it will be a more natural sensation to awaken to at night or during naps. This can be very useful during the first few weeks or months of adapting to CPAP though it does not help medically to wear CPAP during wakefulness and  should not be used as a strategy just to meet guidelines.  Take care of your equipment. Make sure you clean your mask and tubing using directions every day and that your filter and mask are replaced as recommended or if they are not working.     BESIDES CPAP, WHAT OTHER THERAPIES ARE THERE?    Positioning Device  Positioning devices are generally used when sleep apnea is mild and only occurs on your back.This example shows a pillow that straps around the waist. It may be appropriate for those whose  sleep study shows milder sleep apnea that occurs primarily when lying flat on one's back. Preliminary studies have shown benefit but effectiveness at home may need to be verified by a home sleep test. These devices are generally not covered by medical insurance.  Examples of devices that maintain sleeping on the back to prevent snoring and mild sleep apnea.    Belt type body positioner  http://[employer]/    Electronic reminder  http://nightshifttherapy.com/            Oral Appliance  What is oral appliance therapy?  An oral appliance device fits on your teeth at night like a retainer used after having braces. The device is made by a specialized dentist and requires several visits over 1-2 months before a manufactured device is made to fit your teeth and is adjusted to prevent your sleep apnea. Once an oral device is working properly, snoring should be improved. A home sleep test may be recommended at that time if to determine whether the sleep apnea is adequately treated.       Some things to remember:  -Oral devices are often, but not always, covered by your medical insurance. Be sure to check with your insurance provider.   -If you are referred for oral therapy, you will be given a list of specialized dentists to consider or you may choose to visit the Web site of the American Academy of Dental Sleep Medicine  -Oral devices are less likely to work if you have severe sleep apnea or are extremely overweight.     More detailed information  An oral appliance is a small acrylic device that fits over the upper and lower teeth  (similar to a retainer or a mouth guard). This device slightly moves jaw forward, which moves the base of the tongue forward, opens the airway, improves breathing for effective treat snoring and obstructive sleep apnea in perhaps 7 out of 10 people .  The best working devices are custom-made by a dental device  after a mold is made of the teeth 1, 2, 3.  When is an oral appliance  indicated?  Oral appliance therapy is recommended as a first-line treatment for patients with primary snoring, mild sleep apnea, and for patients with moderate sleep apnea who prefer appliance therapy to use of CPAP4, 5. Severity of sleep apnea is determined by sleep testing and is based on the number of respiratory events per hour of sleep.   How successful is oral appliance therapy?  The success rate of oral appliance therapy in patients with mild sleep apnea is 75-80% while in patients with moderate sleep apnea it is 50-70%. The chance of success in patients with severe sleep apnea is 40-50%. The research also shows that oral appliances have a beneficial effect on the cardiovascular health of MARITZA patients at the same magnitude as CPAP therapy7.  Oral appliances should be a second-line treatment in cases of severe sleep apnea, but if not completely successful then a combination therapy utilizing CPAP plus oral appliance therapy may be effective. Oral appliances tend to be effective in a broad range of patients although studies show that the patients who have the highest success are females, younger patients, those with milder disease, and less severe obesity. 3, 6.   Finding a dentist that practices dental sleep medicine  Specific training is available through the American Academy of Dental Sleep Medicine for dentists interested in working in the field of sleep. To find a dentist who is educated in the field of sleep and the use of oral appliances, near you, visit the Web site of the American Academy of Dental Sleep Medicine.    References  1. Colton et al. Objectively measured vs self-reported compliance during oral appliance therapy for sleep-disordered breathing. Chest 2013; 144(5): 1169-6778.  2. Joy, et al. Objective measurement of compliance during oral appliance therapy for sleep-disordered breathing. Thorax 2013; 68(1): 91-96.  3. Leigh et al. Mandibular advancement devices in 620 men and  women with MARITZA and snoring: tolerability and predictors of treatment success. Chest 2004; 125: 4722-3089.  4. Nicko et al. Oral appliances for snoring and MARITZA: a review. Sleep 2006; 29: 244-262.  5. Neville et al. Oral appliance treatment for MARITZA: an update. J Clin Sleep Med 2014; 10(2): 215-227.  6. Moses et al. Predictors of OSAH treatment outcome. J Dent Res 2007; 86: 4932-6541.      Weight Loss:    Weight loss is a long-term strategy that may improve sleep apnea in some patients.    Weight management is a personal decision and the decision should be based on your interest and the potential benefits.  If you are interested in exploring weight loss strategies, the following discussion covers the impact on weight loss on sleep apnea and the approaches that may be successful.    Being overweight does not necessarily mean you will have health consequences.  Those who have BMI over 35 or over 27 with existing medical conditions carries greater risk.   Weight loss decreases severity of sleep apnea in most people with obesity. For those with mild obesity who have developed snoring with weight gain, even 15-30 pound weight loss can improve and occasionally eliminate sleep apnea.  Structured and life-long dietary and health habits are necessary to lose weight and keep healthier weight levels.     Though there may be significant health benefits from weight loss, long-term weight loss is very difficult to achieve- studies show success with dietary management in less than 10% of people. In addition, substantial weight loss may require years of dietary control and may be difficult if patients have severe obesity. In these cases, surgical management may be considered.  Finally, older individuals who have tolerated obesity without health complications may be less likely to benefit from weight loss strategies.      [unfilled]    Surgery:    Surgery for obstructive sleep apnea is considered generally only when other  therapies fail to work. Surgery may be discussed with you if you are having a difficult time tolerating CPAP and or when there is an abnormal structure that requires surgical correction.  Nose and throat surgeries often enlarge the airway to prevent collapse.  Most of these surgeries create pain for 1-2 weeks and up to half of the most common surgeries are not effective throughout life.  You should carefully discuss the benefits and drawbacks to surgery with your sleep provider and surgeon to determine if it is the best solution for you.   More information  Surgery for MARITZA is directed at areas that are responsible for narrowing or complete obstruction of the airway during sleep.  There are a wide range of procedures available to enlarge and/or stabilize the airway to prevent blockage of breathing in the three major areas where it can occur: the palate, tongue, and nasal regions.  Successful surgical treatment depends on the accurate identification of the factors responsible for obstructive sleep apnea in each person.  A personalized approach is required because there is no single treatment that works well for everyone.  Because of anatomic variation, consultation with an examination by a sleep surgeon is a critical first step in determining what surgical options are best for each patient.  In some cases, examination during sedation may be recommended in order to guide the selection of procedures.  Patients will be counseled about risks and benefits as well as the typical recovery course after surgery. Surgery is typically not a cure for a person s MARITZA.  However, surgery will often significantly improve one s MARITZA severity (termed  success rate ).  Even in the absence of a cure, surgery will decrease the cardiovascular risk associated with OSA7; improve overall quality of life8 (sleepiness, functionality, sleep quality, etc).      Palate Procedures:  Patients with MARITZA often have narrowing of their airway in the region  of their tonsils and uvula.  The goals of palate procedures are to widen the airway in this region as well as to help the tissues resist collapse.  Modern palate procedure techniques focus on tissue conservation and soft tissue rearrangement, rather than tissue removal.  Often the uvula is preserved in this procedure. Residual sleep apnea is common in patient after pharyngoplasty with an average reduction in sleep apnea events of 33%2.      Tongue Procedures:  ExamWhile patients are awake, the muscles that surround the throat are active and keep this region open for breathing. These muscles relax during sleep, allowing the tongue and other structures to collapse and block breathing.  There are several different tongue procedures available.  Selection of a tongue base procedure depends on characteristics seen on physical exam.  Generally, procedures are aimed at removing bulky tissues in this area or preventing the back of the tongue from falling back during sleep.  Success rates for tongue surgery range from 50-62%3.    Hypoglossal Nerve Stimulation:  Hypoglossal nerve stimulation has recently received approval from the [place] Food and Drug Administration for the treatment of obstructive sleep apnea.  This is based on research showing that the system was safe and effective in treating sleep apnea6.  Results showed that the median AHI score decreased 68%, from 29.3 to 9.0. This therapy uses an implant system that senses breathing patterns and delivers mild stimulation to airway muscles, which keeps the airway open during sleep.  The system consists of three fully implanted components: a small generator (similar in size to a pacemaker), a breathing sensor, and a stimulation lead.  Using a small handheld remote, a patient turns the therapy on before bed and off upon awakening.    Candidates for this device must be greater than 18 years of age, have moderate to severe MARITZA (AHI between 15-65), BMI less than 35,  have tried CPAP/oral appliance for at least 8 weeks without success, and have appropriate upper airway anatomy (determined by a sleep endoscopy performed by Dr. Aris Schwartz).    Hypoglossal Nerve Stimulation Pathway:    The sleep surgeon s office will work with the patient through the insurance prior-authorization process (including communications and appeals).    Nasal Procedures:  Nasal obstruction can interfere with nasal breathing during the day and night.  Studies have shown that relief of nasal obstruction can improve the ability of some patients to tolerate positive airway pressure therapy for obstructive sleep apnea1.  Treatment options include medications such as nasal saline, topical corticosteroid and antihistamine sprays, and oral medications such as antihistamines or decongestants. Non-surgical treatments can include external nasal dilators for selected patients. If these are not successful by themselves, surgery can improve the nasal airway either alone or in combination with these other options.      Combination Procedures:  Combination of surgical procedures and other treatments may be recommended, particularly if patients have more than one area of narrowing or persistent positional disease.  The success rate of combination surgery ranges from 66-80%2,3.    References  Norma MILAN. The Role of the Nose in Snoring and Obstructive Sleep Apnoea: An Update.  Eur Arch Otorhinolaryngol. 2011; 268: 1365-73.   Andrey SM; Staci JA; Malina JR; Pallanch JF; Wendy MB; Sylvia SG; Maykel BHAGAT. Surgical modifications of the upper airway for obstructive sleep apnea in adults: a systematic review and meta-analysis. SLEEP 2010;33(10):7038-4737. Aureliano GUTHRIE. Hypopharyngeal surgery in obstructive sleep apnea: an evidence-based medicine review.  Arch Otolaryngol Head Neck Surg. 2006 Feb;132(2):206-13.  Humphrey YH1, Shante Y, Bakari PAM. The efficacy of anatomically based multilevel surgery for obstructive sleep apnea.  Otolaryngol Head Neck Surg. 2003 Oct;129(4):327-35.  Aureliano GUTHRIE, Goldberg A. Hypopharyngeal Surgery in Obstructive Sleep Apnea: An Evidence-Based Medicine Review. Arch Otolaryngol Head Neck Surg. 2006 Feb;132(2):206-13.  Armando LOYD et al. Upper-Airway Stimulation for Obstructive Sleep Apnea.  N Engl J Med. 2014 Jan 9;370(2):139-49.  Polina Y et al. Increased Incidence of Cardiovascular Disease in Middle-aged Men with Obstructive Sleep Apnea. Am J Respir Crit Care Med; 2002 166: 159-165  Lala EM et al. Studying Life Effects and Effectiveness of Palatopharyngoplasty (SLEEP) study: Subjective Outcomes of Isolated Uvulopalatopharyngoplasty. Otolaryngol Head Neck Surg. 2011; 144: 623-631.        WHAT IF I ONLY HAVE SNORING?    Mandibular advancement devices, lateral sleep positioning, long-term weight loss and treatment of nasal allergies have been shown to improve snoring.  Exercising tongue muscles with a game ([email or web address]) or stimulating the tongue during the day with a device (https://doi.org/10.3390/knq19846221) have improved snoring in some individuals.    Remember to Drive Safe... Drive Alive     Sleep health profoundly affects your health, mood, and your safety.  Thirty three percent of the population (one in three of us) is not getting enough sleep and many have a sleep disorder. Not getting enough sleep or having an untreated / undertreated sleep condition may make us sleepy without even knowing it. In fact, our driving could be dramatically impaired due to our sleep health. As your provider, here are some things I would like you to know about driving:     Here are some warning signs for impairment and dangerous drowsy driving:              -Having been awake more than 16 hours               -Looking tired               -Eyelid drooping              -Head nodding (it could be too late at this point)              -Driving for more than 30 minutes     Some  things you could do to make the driving safer if you are experiencing some drowsiness:              -Stop driving and rest              -Call for transportation              -Make sure your sleep disorder is adequately treated     Some things that have been shown NOT to work when experiencing drowsiness while driving:              -Turning on the radio              -Opening windows              -Eating any  distracting  /  entertaining  foods (e.g., sunflower seeds, candy, or any other)              -Talking on the phone      Your decision may not only impact your life, but also the life of others. Please, remember to drive safe for yourself and all of us.    Sleep hygiene: Please follow good sleep hygiene/behavioral techniques, including avoiding activities such as reading or using electronic devices in bed, and avoiding alcohol within 2 to 3 hours before bed and avoiding daytime naps.  Anxiety/depression:  We discussed the association of insomnia with anxiety and depression. Recommend  follow-up with your primary care provider for evaluation and management of both anxiety and depression.    Delayed sleep phase:  Recommend following a regular sleep schedule,optimizing the duration and circadian timing of sleep and aim at obtaining at least 7 to 8 hours per night and avoid sleep deprivation.  We arrived at a goal bedtime of 10:30 PM and a goal wake up time of 6:30 AM.  Please minimize exposure to bright light a couple hours before the goal bedtime and avoiding mind stimulating activities before bed. Recommend exposure to bright light for 30 to 60 minutes soon after awakening using bright light box of 10,000 Lux intensity (which can be purchased through online resources ) to help with sleep phase advancement.

## 2023-01-14 ENCOUNTER — HEALTH MAINTENANCE LETTER (OUTPATIENT)
Age: 33
End: 2023-01-14

## 2023-06-02 ENCOUNTER — HEALTH MAINTENANCE LETTER (OUTPATIENT)
Age: 33
End: 2023-06-02

## 2023-07-19 ENCOUNTER — DOCUMENTATION ONLY (OUTPATIENT)
Dept: SLEEP MEDICINE | Facility: CLINIC | Age: 33
End: 2023-07-19

## 2023-07-19 ENCOUNTER — OFFICE VISIT (OUTPATIENT)
Dept: SLEEP MEDICINE | Facility: CLINIC | Age: 33
End: 2023-07-19
Attending: INTERNAL MEDICINE
Payer: COMMERCIAL

## 2023-07-19 DIAGNOSIS — R06.81 WITNESSED EPISODE OF APNEA: ICD-10-CM

## 2023-07-19 PROCEDURE — G0399 HOME SLEEP TEST/TYPE 3 PORTA: HCPCS | Performed by: INTERNAL MEDICINE

## 2023-07-19 NOTE — PROGRESS NOTES
Pt is completing a home sleep test. Pt was instructed on how to put on the Noxturnal T3 device and associated equipment before going to bed and given the opportunity to practice putting it on before leaving the sleep center. Pt was reminded to bring the home sleep test kit back to the center tomorrow, at agreed upon time for download and reporting.   Neck circumference: 43 CM / 17 inches.  Isabella Araya CMA on 7/19/2023 at 11:32 AM

## 2023-07-20 ENCOUNTER — DOCUMENTATION ONLY (OUTPATIENT)
Dept: SLEEP MEDICINE | Facility: CLINIC | Age: 33
End: 2023-07-20
Payer: COMMERCIAL

## 2023-07-20 NOTE — PROGRESS NOTES
HST POST-STUDY QUESTIONNAIRE    1. What time did you go to bed?  11:30  2. How long do you think it took to fall asleep?  45 - 60 min  3. What time did you wake up to start the day?  7:30  4. Did you get up during the night at all?  No  5. If you woke up, do you remember approximately what time(s)? N/A  6. Did you have any difficulty with the equipment?  No  7. Did you us any type of treatment with this study?  None  8. Was the head of the bed elevated? No  9. Did you sleep in a recliner?  No  10. Did you stop using CPAP at least 3 days before this test?  NA  11. Any other information you'd like us to know?

## 2023-07-20 NOTE — PROGRESS NOTES
This HSAT was performed using a Noxturnal T3 device which recorded snore, sound, movement activity, body position, nasal pressure, oronasal thermal airflow, pulse, oximetry and both chest and abdominal respiratory effort. HSAT data was restricted to the time patient states they were in bed.     HSAT was scored using 1B 4% hypopnea rule.     HST AHI (Non-PAT): 12.5  Snoring was reported as loud.  Time with SpO2 below 89% was 0.9 minutes.   Overall signal quality was good.     Pt will follow up with sleep provider to determine appropriate therapy.

## 2023-08-05 DIAGNOSIS — K21.9 GASTROESOPHAGEAL REFLUX DISEASE, UNSPECIFIED WHETHER ESOPHAGITIS PRESENT: ICD-10-CM

## 2023-08-05 NOTE — PROCEDURES
"HOME SLEEP STUDY INTERPRETATION        Patient: Nikolay Myers  MRN: [id number]  YOB: 1990  Study Date: 2023  PCP/Referring Provider: No Ref-Primary, Physician  Ordering Provider: Nicole Todd MD    Indications for Home Study: Nikolay Myers is a 33 year old male with symptoms suggestive of obstructive sleep apnea.  Estimated body mass index is 30.13 kg/m  as calculated from the following:    Height as of 22: 1.778 m (5' 10\").    Weight as of 22: 95.3 kg (210 lb).  Total score - [place]: 5 (2022 10:42 AM)  STOP-BAN/8    Data: A full night home sleep study was performed recording the standard physiologic parameters including body position, movement, sound, nasal pressure, thermal oral airflow, chest and abdominal movements with respiratory inductance plethysmography, and oxygen saturation by pulse oximetry. Pulse rate was estimated by oximetry recording. This study was considered adequate based on > 4 hours of quality oximetry and respiratory recording. As specified by the AASM Manual for the Scoring of Sleep and Associated events, version 2.3, Rule VIII.D 1B, 4% oxygen desaturation scoring for hypopneas is used as a standard of care on all home sleep apnea testing.    Analysis Time: 369.9 minutes    Respiration:   Sleep Associated Hypoxemia: sustained hypoxemia was not present.  Average oxygen saturation was 94.3%.  Time with saturation less than or equal to 88% was 0.9 minutes. The lowest oxygen saturation was 83%.   Snoring: Snoring was present.  Respiratory events: The home study revealed a presence of 15 obstructive apneas and 13 mixed and central apneas. There were 49 hypopneas resulting in a combined apnea/hypopnea index [AHI] of 12.5 events per hour.  AHI was 40.9 per hour supine, 3.5 per hour prone, 84.7 per hour on left side, and 6.5 per hour on right side.   Pattern: Excluding events noted above, respiratory rate and pattern was " Normal.      Position: Percent of time spent: supine - 20.2%, prone -41.9%, on left -0.4%, on right -37.5%.      Heart Rate: By pulse oximetry, the average pulse rate was normal at 83 bpm. The minimum pulse rate was 65 bpm   and the maximum pulse rate was 118 bpm.    Assessment:     Mild obstructive sleep apnea was present.    Sleep associated hypoxemia was not present.    Recommendations:    Consider auto-CPAP at 5-15 cmH2O or oral appliance therapy in conjunction with positional therapy during sleep using devices such as zzoma pillow, slumber bump or sleep noodle..    Suggest optimizing sleep hygiene and avoiding sleep deprivation.    Weight management.      Diagnosis Code(s): Obstructive Sleep Apnea G47.33, Snoring R06.83    Nicole Todd MD, August 5, 2023   Diplomate, American Board of Internal Medicine, Sleep Medicine

## 2023-08-07 RX ORDER — PANTOPRAZOLE SODIUM 40 MG/1
40 TABLET, DELAYED RELEASE ORAL DAILY
Qty: 90 TABLET | Refills: 0 | Status: SHIPPED | OUTPATIENT
Start: 2023-08-07 | End: 2023-11-06

## 2023-08-10 DIAGNOSIS — G47.33 OSA (OBSTRUCTIVE SLEEP APNEA): Primary | ICD-10-CM

## 2023-11-05 DIAGNOSIS — K21.9 GASTROESOPHAGEAL REFLUX DISEASE, UNSPECIFIED WHETHER ESOPHAGITIS PRESENT: ICD-10-CM

## 2023-11-06 RX ORDER — PANTOPRAZOLE SODIUM 40 MG/1
40 TABLET, DELAYED RELEASE ORAL DAILY
Qty: 90 TABLET | Refills: 0 | Status: SHIPPED | OUTPATIENT
Start: 2023-11-06 | End: 2024-03-07

## 2023-12-06 ENCOUNTER — TELEPHONE (OUTPATIENT)
Dept: SLEEP MEDICINE | Facility: CLINIC | Age: 33
End: 2023-12-06

## 2023-12-06 NOTE — TELEPHONE ENCOUNTER
Patient needs to be rescheduled for their virtual visit due to Reason for Reschedule: No-Show    Appointment mode: Video  Provider: ivan

## 2023-12-22 ENCOUNTER — DOCUMENTATION ONLY (OUTPATIENT)
Dept: SLEEP MEDICINE | Facility: CLINIC | Age: 33
End: 2023-12-22
Payer: COMMERCIAL

## 2023-12-22 DIAGNOSIS — G47.33 OSA (OBSTRUCTIVE SLEEP APNEA): Primary | ICD-10-CM

## 2023-12-22 NOTE — PROGRESS NOTES
Patient was offered choice of vendor and chose [facility].  Patient Nikolay Myers was set up at [place] on December 22, 2023. Patient received a Resmed Airsense 11 Pressures were set at  5-15 cm H2O.   Patient s ramp is 4 cm H2O for Auto and FLEX/EPR is EPR, 2.  Patient received a Resmed Mask name: Airfit N30i Nasal mask size Small, Standard, heated tubing and heated humidifier.  Patient has the following compliance requirements: none. Patient has a follow up recommended with Dr. Todd.    Tracy L Fahrenkamp

## 2023-12-28 ENCOUNTER — DOCUMENTATION ONLY (OUTPATIENT)
Dept: SLEEP MEDICINE | Facility: CLINIC | Age: 33
End: 2023-12-28
Payer: COMMERCIAL

## 2023-12-28 NOTE — PROGRESS NOTES
3 day Sleep therapy management telephone visit    Diagnostic AHI:  12.5 HST    Confirmed with patient at time of call- Yes Patient is still interested in STM service       Message left for patient to return call.        Objective data     Order Settings for PAP  CPAP min     CPAP max              Device settings from machine CPAP min 5.0     CPAP max 15.0           EPR Setting TWO    RESMED soft response  OFF     Assessment: Nightly usage most nights under four hours       Action plan: Patient to have 14 day STM visit. Patient has a follow up visit scheduled:   no    Replacement device: No  STM ordered by provider: Yes     Total time spent on accessing and  interpreting remote patient PAP therapy data  10 minutes    Total time spent counseling, coaching  and reviewing PAP therapy data with patient  1 minutes    [id number] no

## 2024-03-07 DIAGNOSIS — K21.9 GASTROESOPHAGEAL REFLUX DISEASE, UNSPECIFIED WHETHER ESOPHAGITIS PRESENT: ICD-10-CM

## 2024-03-07 RX ORDER — PANTOPRAZOLE SODIUM 40 MG/1
40 TABLET, DELAYED RELEASE ORAL DAILY
Qty: 90 TABLET | Refills: 0 | Status: SHIPPED | OUTPATIENT
Start: 2024-03-07 | End: 2024-06-05

## 2024-03-25 ENCOUNTER — NURSE TRIAGE (OUTPATIENT)
Dept: FAMILY MEDICINE | Facility: CLINIC | Age: 34
End: 2024-03-25
Payer: COMMERCIAL

## 2024-03-25 NOTE — TELEPHONE ENCOUNTER
"  S: Rib pain  B: Skiing on 3/22/24. \"Going down the hill at about 35 miles per hour and I fell\".  A: Injured ribs, left side. \"Hurts to get dressed'.  Slight injury to left shoulder which is \"feeling much better\".    Denies: hitting head, blood in urine or stool, shortness of breath, bruising, swelling,     R: Home care. Seek medical attention for red flag symptoms: blood in urine or stool, shortness of breath.  Call clinic if symptoms do not improve in another week.    Patient was given an opportunity to ask questions, verbalized understanding of plan, and is agreeable.     Reason for Disposition   Chest wall swelling, bruise, or pain from direct blow to chest    Additional Information   Negative: SEVERE chest pain   Negative: Difficulty breathing and not severe   Negative: Skin is split open or gaping   Negative: Bleeding won't stop after 10 minutes of direct pressure (using correct technique)   Negative: Sounds like a serious injury to the triager   Negative: Can't take a deep breath but no respiratory distress (e.g., hurts to take a deep breath)   Negative: Shallow puncture wound   Negative: Collarbone is painful and difficulty raising arm   Negative: Patient is confused or is an unreliable provider of information (e.g., dementia, severe intellectual disability, alcohol intoxication)   Negative: Last tetanus shot > 5 years ago and DIRTY cut or scrape   Negative: Last tetanus shot > 10 years ago and CLEAN cut or scrape   Negative: MODERATE pain (e.g., interferes with normal activities) and high-risk adult (e.g., age > 60 years, osteoporosis, chronic steroid use)   Negative: Suspicious history for the injury   Negative: Large swelling or bruise and size > palm of person's hand   Negative: Injury and pain has not improved after 3 days   Negative: Injury is still painful or swollen after 2 weeks   Negative: Small cut (scratch) or abrasion (scrape) is also present    Protocols used: Chest Injury-A-[place]    "

## 2024-06-05 DIAGNOSIS — K21.9 GASTROESOPHAGEAL REFLUX DISEASE, UNSPECIFIED WHETHER ESOPHAGITIS PRESENT: ICD-10-CM

## 2024-06-05 RX ORDER — PANTOPRAZOLE SODIUM 40 MG/1
40 TABLET, DELAYED RELEASE ORAL DAILY
Qty: 90 TABLET | Refills: 0 | Status: SHIPPED | OUTPATIENT
Start: 2024-06-05 | End: 2024-09-10

## 2024-06-30 ENCOUNTER — HEALTH MAINTENANCE LETTER (OUTPATIENT)
Age: 34
End: 2024-06-30

## 2024-09-10 DIAGNOSIS — K21.9 GASTROESOPHAGEAL REFLUX DISEASE, UNSPECIFIED WHETHER ESOPHAGITIS PRESENT: ICD-10-CM

## 2024-09-10 RX ORDER — PANTOPRAZOLE SODIUM 40 MG/1
40 TABLET, DELAYED RELEASE ORAL DAILY
Qty: 90 TABLET | Refills: 0 | Status: SHIPPED | OUTPATIENT
Start: 2024-09-10

## 2024-12-17 DIAGNOSIS — K21.9 GASTROESOPHAGEAL REFLUX DISEASE, UNSPECIFIED WHETHER ESOPHAGITIS PRESENT: ICD-10-CM

## 2024-12-17 RX ORDER — PANTOPRAZOLE SODIUM 40 MG/1
40 TABLET, DELAYED RELEASE ORAL DAILY
Qty: 90 TABLET | Refills: 0 | Status: SHIPPED | OUTPATIENT
Start: 2024-12-17

## 2025-07-13 ENCOUNTER — HEALTH MAINTENANCE LETTER (OUTPATIENT)
Age: 35
End: 2025-07-13